# Patient Record
Sex: FEMALE | Race: WHITE | NOT HISPANIC OR LATINO | URBAN - METROPOLITAN AREA
[De-identification: names, ages, dates, MRNs, and addresses within clinical notes are randomized per-mention and may not be internally consistent; named-entity substitution may affect disease eponyms.]

---

## 2017-01-01 ENCOUNTER — INPATIENT (INPATIENT)
Facility: HOSPITAL | Age: 82
LOS: 3 days | DRG: 64 | End: 2017-07-26
Attending: INTERNAL MEDICINE | Admitting: PSYCHIATRY & NEUROLOGY
Payer: MEDICARE

## 2017-01-01 VITALS
SYSTOLIC BLOOD PRESSURE: 145 MMHG | DIASTOLIC BLOOD PRESSURE: 71 MMHG | HEART RATE: 96 BPM | TEMPERATURE: 98 F | OXYGEN SATURATION: 95 % | RESPIRATION RATE: 25 BRPM

## 2017-01-01 VITALS
OXYGEN SATURATION: 100 % | DIASTOLIC BLOOD PRESSURE: 89 MMHG | HEART RATE: 86 BPM | SYSTOLIC BLOOD PRESSURE: 164 MMHG | RESPIRATION RATE: 16 BRPM

## 2017-01-01 DIAGNOSIS — I63.9 CEREBRAL INFARCTION, UNSPECIFIED: ICD-10-CM

## 2017-01-01 DIAGNOSIS — Z98.890 OTHER SPECIFIED POSTPROCEDURAL STATES: Chronic | ICD-10-CM

## 2017-01-01 DIAGNOSIS — J96.00 ACUTE RESPIRATORY FAILURE, UNSPECIFIED WHETHER WITH HYPOXIA OR HYPERCAPNIA: ICD-10-CM

## 2017-01-01 DIAGNOSIS — G93.40 ENCEPHALOPATHY, UNSPECIFIED: ICD-10-CM

## 2017-01-01 DIAGNOSIS — Z51.5 ENCOUNTER FOR PALLIATIVE CARE: ICD-10-CM

## 2017-01-01 DIAGNOSIS — R52 PAIN, UNSPECIFIED: ICD-10-CM

## 2017-01-01 LAB
ALBUMIN SERPL ELPH-MCNC: 3.7 G/DL — SIGNIFICANT CHANGE UP (ref 3.3–5)
ALP SERPL-CCNC: 130 U/L — HIGH (ref 40–120)
ALT FLD-CCNC: 17 U/L RC — SIGNIFICANT CHANGE UP (ref 10–45)
ANION GAP SERPL CALC-SCNC: 10 MMOL/L — SIGNIFICANT CHANGE UP (ref 5–17)
ANION GAP SERPL CALC-SCNC: 12 MMOL/L — SIGNIFICANT CHANGE UP (ref 5–17)
ANION GAP SERPL CALC-SCNC: 14 MMOL/L — SIGNIFICANT CHANGE UP (ref 5–17)
APTT BLD: 42.9 SEC — HIGH (ref 27.5–37.4)
AST SERPL-CCNC: 25 U/L — SIGNIFICANT CHANGE UP (ref 10–40)
BASOPHILS # BLD AUTO: 0.1 K/UL — SIGNIFICANT CHANGE UP (ref 0–0.2)
BASOPHILS NFR BLD AUTO: 0.7 % — SIGNIFICANT CHANGE UP (ref 0–2)
BILIRUB SERPL-MCNC: 0.4 MG/DL — SIGNIFICANT CHANGE UP (ref 0.2–1.2)
BLD GP AB SCN SERPL QL: NEGATIVE — SIGNIFICANT CHANGE UP
BUN SERPL-MCNC: 25 MG/DL — HIGH (ref 7–23)
BUN SERPL-MCNC: 25 MG/DL — HIGH (ref 7–23)
BUN SERPL-MCNC: 31 MG/DL — HIGH (ref 7–23)
CALCIUM SERPL-MCNC: 8.6 MG/DL — SIGNIFICANT CHANGE UP (ref 8.4–10.5)
CALCIUM SERPL-MCNC: 8.7 MG/DL — SIGNIFICANT CHANGE UP (ref 8.4–10.5)
CALCIUM SERPL-MCNC: 9.4 MG/DL — SIGNIFICANT CHANGE UP (ref 8.4–10.5)
CHLORIDE SERPL-SCNC: 106 MMOL/L — SIGNIFICANT CHANGE UP (ref 96–108)
CHLORIDE SERPL-SCNC: 108 MMOL/L — SIGNIFICANT CHANGE UP (ref 96–108)
CHLORIDE SERPL-SCNC: 112 MMOL/L — HIGH (ref 96–108)
CHOLEST SERPL-MCNC: 212 MG/DL — HIGH (ref 10–199)
CK MB BLD-MCNC: 7.3 % — HIGH (ref 0–3.5)
CK MB CFR SERPL CALC: 1.1 NG/ML — SIGNIFICANT CHANGE UP (ref 0–3.8)
CK MB CFR SERPL CALC: 1.4 NG/ML — SIGNIFICANT CHANGE UP (ref 0–3.8)
CK MB CFR SERPL CALC: 1.9 NG/ML — SIGNIFICANT CHANGE UP (ref 0–3.8)
CK SERPL-CCNC: 23 U/L — LOW (ref 25–170)
CK SERPL-CCNC: 26 U/L — SIGNIFICANT CHANGE UP (ref 25–170)
CO2 SERPL-SCNC: 22 MMOL/L — SIGNIFICANT CHANGE UP (ref 22–31)
CO2 SERPL-SCNC: 24 MMOL/L — SIGNIFICANT CHANGE UP (ref 22–31)
CO2 SERPL-SCNC: 26 MMOL/L — SIGNIFICANT CHANGE UP (ref 22–31)
CREAT SERPL-MCNC: 0.46 MG/DL — LOW (ref 0.5–1.3)
CREAT SERPL-MCNC: 0.52 MG/DL — SIGNIFICANT CHANGE UP (ref 0.5–1.3)
CREAT SERPL-MCNC: 0.75 MG/DL — SIGNIFICANT CHANGE UP (ref 0.5–1.3)
DIGOXIN SERPL-MCNC: 0.3 NG/ML — LOW (ref 0.8–2)
EOSINOPHIL # BLD AUTO: 0.1 K/UL — SIGNIFICANT CHANGE UP (ref 0–0.5)
EOSINOPHIL NFR BLD AUTO: 1.4 % — SIGNIFICANT CHANGE UP (ref 0–6)
GAS PNL BLDA: SIGNIFICANT CHANGE UP
GAS PNL BLDV: SIGNIFICANT CHANGE UP
GLUCOSE SERPL-MCNC: 144 MG/DL — HIGH (ref 70–99)
GLUCOSE SERPL-MCNC: 153 MG/DL — HIGH (ref 70–99)
GLUCOSE SERPL-MCNC: 184 MG/DL — HIGH (ref 70–99)
HBA1C BLD-MCNC: 5.7 % — HIGH (ref 4–5.6)
HCT VFR BLD CALC: 33.4 % — LOW (ref 34.5–45)
HCT VFR BLD CALC: 33.7 % — LOW (ref 34.5–45)
HCT VFR BLD CALC: 36.8 % — SIGNIFICANT CHANGE UP (ref 34.5–45)
HDLC SERPL-MCNC: 60 MG/DL — SIGNIFICANT CHANGE UP (ref 40–125)
HGB BLD-MCNC: 11 G/DL — LOW (ref 11.5–15.5)
HGB BLD-MCNC: 11.1 G/DL — LOW (ref 11.5–15.5)
HGB BLD-MCNC: 11.8 G/DL — SIGNIFICANT CHANGE UP (ref 11.5–15.5)
INR BLD: 1.18 RATIO — HIGH (ref 0.88–1.16)
LIPID PNL WITH DIRECT LDL SERPL: 138 MG/DL — HIGH
LYMPHOCYTES # BLD AUTO: 2.2 K/UL — SIGNIFICANT CHANGE UP (ref 1–3.3)
LYMPHOCYTES # BLD AUTO: 25.3 % — SIGNIFICANT CHANGE UP (ref 13–44)
MAGNESIUM SERPL-MCNC: 1.9 MG/DL — SIGNIFICANT CHANGE UP (ref 1.6–2.6)
MCHC RBC-ENTMCNC: 32 GM/DL — SIGNIFICANT CHANGE UP (ref 32–36)
MCHC RBC-ENTMCNC: 32.7 GM/DL — SIGNIFICANT CHANGE UP (ref 32–36)
MCHC RBC-ENTMCNC: 33 PG — SIGNIFICANT CHANGE UP (ref 27–34)
MCHC RBC-ENTMCNC: 33.2 GM/DL — SIGNIFICANT CHANGE UP (ref 32–36)
MCHC RBC-ENTMCNC: 33.5 PG — SIGNIFICANT CHANGE UP (ref 27–34)
MCHC RBC-ENTMCNC: 33.9 PG — SIGNIFICANT CHANGE UP (ref 27–34)
MCV RBC AUTO: 102 FL — HIGH (ref 80–100)
MCV RBC AUTO: 102 FL — HIGH (ref 80–100)
MCV RBC AUTO: 103 FL — HIGH (ref 80–100)
MONOCYTES # BLD AUTO: 0.6 K/UL — SIGNIFICANT CHANGE UP (ref 0–0.9)
MONOCYTES NFR BLD AUTO: 7.1 % — SIGNIFICANT CHANGE UP (ref 2–14)
NEUTROPHILS # BLD AUTO: 5.8 K/UL — SIGNIFICANT CHANGE UP (ref 1.8–7.4)
NEUTROPHILS NFR BLD AUTO: 65.5 % — SIGNIFICANT CHANGE UP (ref 43–77)
PHOSPHATE SERPL-MCNC: 2.5 MG/DL — SIGNIFICANT CHANGE UP (ref 2.5–4.5)
PHOSPHATE SERPL-MCNC: 3.1 MG/DL — SIGNIFICANT CHANGE UP (ref 2.5–4.5)
PHOSPHATE SERPL-MCNC: 3.5 MG/DL — SIGNIFICANT CHANGE UP (ref 2.5–4.5)
PLATELET # BLD AUTO: 236 K/UL — SIGNIFICANT CHANGE UP (ref 150–400)
PLATELET # BLD AUTO: 250 K/UL — SIGNIFICANT CHANGE UP (ref 150–400)
PLATELET # BLD AUTO: 299 K/UL — SIGNIFICANT CHANGE UP (ref 150–400)
POTASSIUM SERPL-MCNC: 3.9 MMOL/L — SIGNIFICANT CHANGE UP (ref 3.5–5.3)
POTASSIUM SERPL-MCNC: 4.3 MMOL/L — SIGNIFICANT CHANGE UP (ref 3.5–5.3)
POTASSIUM SERPL-MCNC: 5 MMOL/L — SIGNIFICANT CHANGE UP (ref 3.5–5.3)
POTASSIUM SERPL-SCNC: 3.9 MMOL/L — SIGNIFICANT CHANGE UP (ref 3.5–5.3)
POTASSIUM SERPL-SCNC: 4.3 MMOL/L — SIGNIFICANT CHANGE UP (ref 3.5–5.3)
POTASSIUM SERPL-SCNC: 5 MMOL/L — SIGNIFICANT CHANGE UP (ref 3.5–5.3)
PROT SERPL-MCNC: 7.2 G/DL — SIGNIFICANT CHANGE UP (ref 6–8.3)
PROTHROM AB SERPL-ACNC: 12.9 SEC — HIGH (ref 9.8–12.7)
RBC # BLD: 3.27 M/UL — LOW (ref 3.8–5.2)
RBC # BLD: 3.29 M/UL — LOW (ref 3.8–5.2)
RBC # BLD: 3.57 M/UL — LOW (ref 3.8–5.2)
RBC # FLD: 13.3 % — SIGNIFICANT CHANGE UP (ref 10.3–14.5)
RBC # FLD: 13.4 % — SIGNIFICANT CHANGE UP (ref 10.3–14.5)
RBC # FLD: 13.4 % — SIGNIFICANT CHANGE UP (ref 10.3–14.5)
RH IG SCN BLD-IMP: POSITIVE — SIGNIFICANT CHANGE UP
RH IG SCN BLD-IMP: POSITIVE — SIGNIFICANT CHANGE UP
SODIUM SERPL-SCNC: 142 MMOL/L — SIGNIFICANT CHANGE UP (ref 135–145)
SODIUM SERPL-SCNC: 144 MMOL/L — SIGNIFICANT CHANGE UP (ref 135–145)
SODIUM SERPL-SCNC: 148 MMOL/L — HIGH (ref 135–145)
T4 AB SER-ACNC: 6.8 UG/DL — SIGNIFICANT CHANGE UP (ref 4.6–12)
TOTAL CHOLESTEROL/HDL RATIO MEASUREMENT: 3.5 RATIO — SIGNIFICANT CHANGE UP (ref 3.3–7.1)
TRIGL SERPL-MCNC: 71 MG/DL — SIGNIFICANT CHANGE UP (ref 10–149)
TROPONIN T SERPL-MCNC: <0.01 NG/ML — SIGNIFICANT CHANGE UP (ref 0–0.06)
TSH SERPL-MCNC: 1.91 UIU/ML — SIGNIFICANT CHANGE UP (ref 0.27–4.2)
WBC # BLD: 11 K/UL — HIGH (ref 3.8–10.5)
WBC # BLD: 8.9 K/UL — SIGNIFICANT CHANGE UP (ref 3.8–10.5)
WBC # BLD: 9.7 K/UL — SIGNIFICANT CHANGE UP (ref 3.8–10.5)
WBC # FLD AUTO: 11 K/UL — HIGH (ref 3.8–10.5)
WBC # FLD AUTO: 8.9 K/UL — SIGNIFICANT CHANGE UP (ref 3.8–10.5)
WBC # FLD AUTO: 9.7 K/UL — SIGNIFICANT CHANGE UP (ref 3.8–10.5)

## 2017-01-01 PROCEDURE — 93010 ELECTROCARDIOGRAM REPORT: CPT

## 2017-01-01 PROCEDURE — 94002 VENT MGMT INPAT INIT DAY: CPT

## 2017-01-01 PROCEDURE — 80048 BASIC METABOLIC PNL TOTAL CA: CPT

## 2017-01-01 PROCEDURE — 70450 CT HEAD/BRAIN W/O DYE: CPT | Mod: 26

## 2017-01-01 PROCEDURE — 70450 CT HEAD/BRAIN W/O DYE: CPT

## 2017-01-01 PROCEDURE — 83036 HEMOGLOBIN GLYCOSYLATED A1C: CPT

## 2017-01-01 PROCEDURE — 99291 CRITICAL CARE FIRST HOUR: CPT

## 2017-01-01 PROCEDURE — 93970 EXTREMITY STUDY: CPT

## 2017-01-01 PROCEDURE — 85610 PROTHROMBIN TIME: CPT

## 2017-01-01 PROCEDURE — 82803 BLOOD GASES ANY COMBINATION: CPT

## 2017-01-01 PROCEDURE — 86850 RBC ANTIBODY SCREEN: CPT

## 2017-01-01 PROCEDURE — 96374 THER/PROPH/DIAG INJ IV PUSH: CPT

## 2017-01-01 PROCEDURE — 99291 CRITICAL CARE FIRST HOUR: CPT | Mod: 25

## 2017-01-01 PROCEDURE — 82550 ASSAY OF CK (CPK): CPT

## 2017-01-01 PROCEDURE — 99223 1ST HOSP IP/OBS HIGH 75: CPT

## 2017-01-01 PROCEDURE — 70496 CT ANGIOGRAPHY HEAD: CPT | Mod: 26

## 2017-01-01 PROCEDURE — 83605 ASSAY OF LACTIC ACID: CPT

## 2017-01-01 PROCEDURE — 84100 ASSAY OF PHOSPHORUS: CPT

## 2017-01-01 PROCEDURE — 70496 CT ANGIOGRAPHY HEAD: CPT

## 2017-01-01 PROCEDURE — 93005 ELECTROCARDIOGRAM TRACING: CPT

## 2017-01-01 PROCEDURE — 84436 ASSAY OF TOTAL THYROXINE: CPT

## 2017-01-01 PROCEDURE — 71010: CPT | Mod: 26

## 2017-01-01 PROCEDURE — 86901 BLOOD TYPING SEROLOGIC RH(D): CPT

## 2017-01-01 PROCEDURE — 80162 ASSAY OF DIGOXIN TOTAL: CPT

## 2017-01-01 PROCEDURE — 82553 CREATINE MB FRACTION: CPT

## 2017-01-01 PROCEDURE — 70498 CT ANGIOGRAPHY NECK: CPT | Mod: 26

## 2017-01-01 PROCEDURE — 84132 ASSAY OF SERUM POTASSIUM: CPT

## 2017-01-01 PROCEDURE — 70498 CT ANGIOGRAPHY NECK: CPT

## 2017-01-01 PROCEDURE — 93306 TTE W/DOPPLER COMPLETE: CPT

## 2017-01-01 PROCEDURE — 85027 COMPLETE CBC AUTOMATED: CPT

## 2017-01-01 PROCEDURE — 83735 ASSAY OF MAGNESIUM: CPT

## 2017-01-01 PROCEDURE — 82947 ASSAY GLUCOSE BLOOD QUANT: CPT

## 2017-01-01 PROCEDURE — 82330 ASSAY OF CALCIUM: CPT

## 2017-01-01 PROCEDURE — 85730 THROMBOPLASTIN TIME PARTIAL: CPT

## 2017-01-01 PROCEDURE — 80053 COMPREHEN METABOLIC PANEL: CPT

## 2017-01-01 PROCEDURE — 86900 BLOOD TYPING SEROLOGIC ABO: CPT

## 2017-01-01 PROCEDURE — 85014 HEMATOCRIT: CPT

## 2017-01-01 PROCEDURE — 94640 AIRWAY INHALATION TREATMENT: CPT

## 2017-01-01 PROCEDURE — 93306 TTE W/DOPPLER COMPLETE: CPT | Mod: 26

## 2017-01-01 PROCEDURE — 70450 CT HEAD/BRAIN W/O DYE: CPT | Mod: 26,59

## 2017-01-01 PROCEDURE — 93970 EXTREMITY STUDY: CPT | Mod: 26

## 2017-01-01 PROCEDURE — 84484 ASSAY OF TROPONIN QUANT: CPT

## 2017-01-01 PROCEDURE — 80061 LIPID PANEL: CPT

## 2017-01-01 PROCEDURE — 71045 X-RAY EXAM CHEST 1 VIEW: CPT

## 2017-01-01 PROCEDURE — 82435 ASSAY OF BLOOD CHLORIDE: CPT

## 2017-01-01 PROCEDURE — 99231 SBSQ HOSP IP/OBS SF/LOW 25: CPT

## 2017-01-01 PROCEDURE — 84443 ASSAY THYROID STIM HORMONE: CPT

## 2017-01-01 PROCEDURE — 84295 ASSAY OF SERUM SODIUM: CPT

## 2017-01-01 RX ORDER — DIGOXIN 250 MCG
0.25 TABLET ORAL ONCE
Qty: 0 | Refills: 0 | Status: COMPLETED | OUTPATIENT
Start: 2017-01-01 | End: 2017-01-01

## 2017-01-01 RX ORDER — IPRATROPIUM/ALBUTEROL SULFATE 18-103MCG
3 AEROSOL WITH ADAPTER (GRAM) INHALATION EVERY 6 HOURS
Qty: 0 | Refills: 0 | Status: COMPLETED | OUTPATIENT
Start: 2017-01-01 | End: 2017-01-01

## 2017-01-01 RX ORDER — DEXMEDETOMIDINE HYDROCHLORIDE IN 0.9% SODIUM CHLORIDE 4 UG/ML
0.2 INJECTION INTRAVENOUS
Qty: 200 | Refills: 0 | Status: DISCONTINUED | OUTPATIENT
Start: 2017-01-01 | End: 2017-01-01

## 2017-01-01 RX ORDER — ENOXAPARIN SODIUM 100 MG/ML
40 INJECTION SUBCUTANEOUS DAILY
Qty: 0 | Refills: 0 | Status: DISCONTINUED | OUTPATIENT
Start: 2017-01-01 | End: 2017-01-01

## 2017-01-01 RX ORDER — CHLORHEXIDINE GLUCONATE 213 G/1000ML
15 SOLUTION TOPICAL
Qty: 0 | Refills: 0 | Status: DISCONTINUED | OUTPATIENT
Start: 2017-01-01 | End: 2017-01-01

## 2017-01-01 RX ORDER — ACETAMINOPHEN 500 MG
650 TABLET ORAL ONCE
Qty: 0 | Refills: 0 | Status: COMPLETED | OUTPATIENT
Start: 2017-01-01 | End: 2017-01-01

## 2017-01-01 RX ORDER — MORPHINE SULFATE 50 MG/1
0.5 CAPSULE, EXTENDED RELEASE ORAL
Qty: 100 | Refills: 0 | Status: DISCONTINUED | OUTPATIENT
Start: 2017-01-01 | End: 2017-01-01

## 2017-01-01 RX ORDER — ROBINUL 0.2 MG/ML
0.1 INJECTION INTRAMUSCULAR; INTRAVENOUS EVERY 12 HOURS
Qty: 0 | Refills: 0 | Status: DISCONTINUED | OUTPATIENT
Start: 2017-01-01 | End: 2017-01-01

## 2017-01-01 RX ORDER — MORPHINE SULFATE 50 MG/1
1 CAPSULE, EXTENDED RELEASE ORAL
Qty: 0 | Refills: 0 | Status: DISCONTINUED | OUTPATIENT
Start: 2017-01-01 | End: 2017-01-01

## 2017-01-01 RX ORDER — ASPIRIN/CALCIUM CARB/MAGNESIUM 324 MG
300 TABLET ORAL DAILY
Qty: 0 | Refills: 0 | Status: DISCONTINUED | OUTPATIENT
Start: 2017-01-01 | End: 2017-01-01

## 2017-01-01 RX ORDER — PROPOFOL 10 MG/ML
10 INJECTION, EMULSION INTRAVENOUS
Qty: 1000 | Refills: 0 | Status: DISCONTINUED | OUTPATIENT
Start: 2017-01-01 | End: 2017-01-01

## 2017-01-01 RX ORDER — ACETYLCYSTEINE 200 MG/ML
3 VIAL (ML) MISCELLANEOUS EVERY 6 HOURS
Qty: 0 | Refills: 0 | Status: COMPLETED | OUTPATIENT
Start: 2017-01-01 | End: 2017-01-01

## 2017-01-01 RX ORDER — MORPHINE SULFATE 50 MG/1
1 CAPSULE, EXTENDED RELEASE ORAL
Qty: 100 | Refills: 0 | Status: DISCONTINUED | OUTPATIENT
Start: 2017-01-01 | End: 2017-01-01

## 2017-01-01 RX ORDER — MORPHINE SULFATE 50 MG/1
2 CAPSULE, EXTENDED RELEASE ORAL
Qty: 0 | Refills: 0 | Status: DISCONTINUED | OUTPATIENT
Start: 2017-01-01 | End: 2017-01-01

## 2017-01-01 RX ORDER — ROBINUL 0.2 MG/ML
0.4 INJECTION INTRAMUSCULAR; INTRAVENOUS EVERY 6 HOURS
Qty: 0 | Refills: 0 | Status: DISCONTINUED | OUTPATIENT
Start: 2017-01-01 | End: 2017-01-01

## 2017-01-01 RX ORDER — ROBINUL 0.2 MG/ML
0.2 INJECTION INTRAMUSCULAR; INTRAVENOUS ONCE
Qty: 0 | Refills: 0 | Status: COMPLETED | OUTPATIENT
Start: 2017-01-01 | End: 2017-01-01

## 2017-01-01 RX ORDER — PANTOPRAZOLE SODIUM 20 MG/1
40 TABLET, DELAYED RELEASE ORAL DAILY
Qty: 0 | Refills: 0 | Status: DISCONTINUED | OUTPATIENT
Start: 2017-01-01 | End: 2017-01-01

## 2017-01-01 RX ORDER — DIGOXIN 250 MCG
0.25 TABLET ORAL ONCE
Qty: 0 | Refills: 0 | Status: DISCONTINUED | OUTPATIENT
Start: 2017-01-01 | End: 2017-01-01

## 2017-01-01 RX ORDER — ASPIRIN/CALCIUM CARB/MAGNESIUM 324 MG
81 TABLET ORAL DAILY
Qty: 0 | Refills: 0 | Status: DISCONTINUED | OUTPATIENT
Start: 2017-01-01 | End: 2017-01-01

## 2017-01-01 RX ORDER — SENNA PLUS 8.6 MG/1
2 TABLET ORAL AT BEDTIME
Qty: 0 | Refills: 0 | Status: DISCONTINUED | OUTPATIENT
Start: 2017-01-01 | End: 2017-01-01

## 2017-01-01 RX ORDER — DIGOXIN 250 MCG
0.12 TABLET ORAL DAILY
Qty: 0 | Refills: 0 | Status: DISCONTINUED | OUTPATIENT
Start: 2017-01-01 | End: 2017-01-01

## 2017-01-01 RX ORDER — ATORVASTATIN CALCIUM 80 MG/1
40 TABLET, FILM COATED ORAL AT BEDTIME
Qty: 0 | Refills: 0 | Status: DISCONTINUED | OUTPATIENT
Start: 2017-01-01 | End: 2017-01-01

## 2017-01-01 RX ORDER — SODIUM CHLORIDE 5 G/100ML
1000 INJECTION, SOLUTION INTRAVENOUS
Qty: 0 | Refills: 0 | Status: DISCONTINUED | OUTPATIENT
Start: 2017-01-01 | End: 2017-01-01

## 2017-01-01 RX ORDER — ROBINUL 0.2 MG/ML
1 INJECTION INTRAMUSCULAR; INTRAVENOUS EVERY 12 HOURS
Qty: 0 | Refills: 0 | Status: DISCONTINUED | OUTPATIENT
Start: 2017-01-01 | End: 2017-01-01

## 2017-01-01 RX ADMIN — MORPHINE SULFATE 1 MILLIGRAM(S): 50 CAPSULE, EXTENDED RELEASE ORAL at 01:35

## 2017-01-01 RX ADMIN — Medication 3 MILLILITER(S): at 18:58

## 2017-01-01 RX ADMIN — ENOXAPARIN SODIUM 40 MILLIGRAM(S): 100 INJECTION SUBCUTANEOUS at 13:24

## 2017-01-01 RX ADMIN — Medication 0.25 MILLIGRAM(S): at 18:33

## 2017-01-01 RX ADMIN — SODIUM CHLORIDE 50 MILLILITER(S): 5 INJECTION, SOLUTION INTRAVENOUS at 05:53

## 2017-01-01 RX ADMIN — MORPHINE SULFATE 1 MILLIGRAM(S): 50 CAPSULE, EXTENDED RELEASE ORAL at 06:53

## 2017-01-01 RX ADMIN — CHLORHEXIDINE GLUCONATE 15 MILLILITER(S): 213 SOLUTION TOPICAL at 18:01

## 2017-01-01 RX ADMIN — MORPHINE SULFATE 1 MILLIGRAM(S): 50 CAPSULE, EXTENDED RELEASE ORAL at 18:59

## 2017-01-01 RX ADMIN — Medication 3 MILLILITER(S): at 05:24

## 2017-01-01 RX ADMIN — MORPHINE SULFATE 2 MILLIGRAM(S): 50 CAPSULE, EXTENDED RELEASE ORAL at 05:23

## 2017-01-01 RX ADMIN — MORPHINE SULFATE 1 MG/HR: 50 CAPSULE, EXTENDED RELEASE ORAL at 01:46

## 2017-01-01 RX ADMIN — MORPHINE SULFATE 1 MILLIGRAM(S): 50 CAPSULE, EXTENDED RELEASE ORAL at 13:59

## 2017-01-01 RX ADMIN — MORPHINE SULFATE 1 MILLIGRAM(S): 50 CAPSULE, EXTENDED RELEASE ORAL at 23:45

## 2017-01-01 RX ADMIN — Medication 3 MILLILITER(S): at 18:57

## 2017-01-01 RX ADMIN — Medication 650 MILLIGRAM(S): at 20:41

## 2017-01-01 RX ADMIN — MORPHINE SULFATE 0.5 MG/HR: 50 CAPSULE, EXTENDED RELEASE ORAL at 22:48

## 2017-01-01 RX ADMIN — Medication 3 MILLILITER(S): at 12:01

## 2017-01-01 RX ADMIN — MORPHINE SULFATE 1 MILLIGRAM(S): 50 CAPSULE, EXTENDED RELEASE ORAL at 10:58

## 2017-01-01 RX ADMIN — ROBINUL 0.1 MILLIGRAM(S): 0.2 INJECTION INTRAMUSCULAR; INTRAVENOUS at 18:43

## 2017-01-01 RX ADMIN — Medication 3 MILLILITER(S): at 00:00

## 2017-01-01 RX ADMIN — MORPHINE SULFATE 1 MILLIGRAM(S): 50 CAPSULE, EXTENDED RELEASE ORAL at 16:23

## 2017-01-01 RX ADMIN — MORPHINE SULFATE 2 MILLIGRAM(S): 50 CAPSULE, EXTENDED RELEASE ORAL at 05:08

## 2017-01-01 RX ADMIN — MORPHINE SULFATE 1 MILLIGRAM(S): 50 CAPSULE, EXTENDED RELEASE ORAL at 19:00

## 2017-01-01 RX ADMIN — Medication 81 MILLIGRAM(S): at 12:03

## 2017-01-01 RX ADMIN — MORPHINE SULFATE 1 MILLIGRAM(S): 50 CAPSULE, EXTENDED RELEASE ORAL at 02:54

## 2017-01-01 RX ADMIN — MORPHINE SULFATE 1 MILLIGRAM(S): 50 CAPSULE, EXTENDED RELEASE ORAL at 20:08

## 2017-01-01 RX ADMIN — ATORVASTATIN CALCIUM 40 MILLIGRAM(S): 80 TABLET, FILM COATED ORAL at 22:37

## 2017-01-01 RX ADMIN — MORPHINE SULFATE 1 MILLIGRAM(S): 50 CAPSULE, EXTENDED RELEASE ORAL at 01:32

## 2017-01-01 RX ADMIN — MORPHINE SULFATE 1 MILLIGRAM(S): 50 CAPSULE, EXTENDED RELEASE ORAL at 19:53

## 2017-01-01 RX ADMIN — DEXMEDETOMIDINE HYDROCHLORIDE IN 0.9% SODIUM CHLORIDE 3.21 MICROGRAM(S)/KG/HR: 4 INJECTION INTRAVENOUS at 06:39

## 2017-01-01 RX ADMIN — ROBINUL 0.4 MILLIGRAM(S): 0.2 INJECTION INTRAMUSCULAR; INTRAVENOUS at 23:59

## 2017-01-01 RX ADMIN — Medication 650 MILLIGRAM(S): at 20:26

## 2017-01-01 RX ADMIN — MORPHINE SULFATE 1 MILLIGRAM(S): 50 CAPSULE, EXTENDED RELEASE ORAL at 18:44

## 2017-01-01 RX ADMIN — Medication 0.5 MILLIGRAM(S): at 22:55

## 2017-01-01 RX ADMIN — PROPOFOL 3.81 MICROGRAM(S)/KG/MIN: 10 INJECTION, EMULSION INTRAVENOUS at 18:35

## 2017-01-01 RX ADMIN — SODIUM CHLORIDE 50 MILLILITER(S): 5 INJECTION, SOLUTION INTRAVENOUS at 10:00

## 2017-01-01 RX ADMIN — Medication 0.5 MILLIGRAM(S): at 05:44

## 2017-01-01 RX ADMIN — MORPHINE SULFATE 1 MILLIGRAM(S): 50 CAPSULE, EXTENDED RELEASE ORAL at 23:07

## 2017-01-01 RX ADMIN — ROBINUL 0.4 MILLIGRAM(S): 0.2 INJECTION INTRAMUSCULAR; INTRAVENOUS at 05:08

## 2017-01-01 RX ADMIN — Medication 3 MILLILITER(S): at 12:02

## 2017-01-01 RX ADMIN — MORPHINE SULFATE 1 MILLIGRAM(S): 50 CAPSULE, EXTENDED RELEASE ORAL at 14:14

## 2017-01-01 RX ADMIN — ENOXAPARIN SODIUM 40 MILLIGRAM(S): 100 INJECTION SUBCUTANEOUS at 18:33

## 2017-01-01 RX ADMIN — MORPHINE SULFATE 1 MILLIGRAM(S): 50 CAPSULE, EXTENDED RELEASE ORAL at 02:39

## 2017-01-01 RX ADMIN — CHLORHEXIDINE GLUCONATE 15 MILLILITER(S): 213 SOLUTION TOPICAL at 05:52

## 2017-01-01 RX ADMIN — PANTOPRAZOLE SODIUM 40 MILLIGRAM(S): 20 TABLET, DELAYED RELEASE ORAL at 12:46

## 2017-01-01 RX ADMIN — MORPHINE SULFATE 1 MILLIGRAM(S): 50 CAPSULE, EXTENDED RELEASE ORAL at 18:45

## 2017-01-01 RX ADMIN — Medication 3 MILLILITER(S): at 05:25

## 2017-01-01 RX ADMIN — SENNA PLUS 2 TABLET(S): 8.6 TABLET ORAL at 22:37

## 2017-01-01 RX ADMIN — MORPHINE SULFATE 1 MILLIGRAM(S): 50 CAPSULE, EXTENDED RELEASE ORAL at 16:08

## 2017-01-01 RX ADMIN — PANTOPRAZOLE SODIUM 40 MILLIGRAM(S): 20 TABLET, DELAYED RELEASE ORAL at 12:04

## 2017-01-01 RX ADMIN — MORPHINE SULFATE 1 MILLIGRAM(S): 50 CAPSULE, EXTENDED RELEASE ORAL at 23:22

## 2017-01-01 RX ADMIN — ROBINUL 0.2 MILLIGRAM(S): 0.2 INJECTION INTRAMUSCULAR; INTRAVENOUS at 18:33

## 2017-01-01 RX ADMIN — MORPHINE SULFATE 1 MILLIGRAM(S): 50 CAPSULE, EXTENDED RELEASE ORAL at 07:08

## 2017-01-01 RX ADMIN — MORPHINE SULFATE 1 MILLIGRAM(S): 50 CAPSULE, EXTENDED RELEASE ORAL at 01:20

## 2017-01-01 RX ADMIN — CHLORHEXIDINE GLUCONATE 15 MILLILITER(S): 213 SOLUTION TOPICAL at 18:34

## 2017-01-01 RX ADMIN — Medication 1 TABLET(S): at 14:39

## 2017-01-01 RX ADMIN — Medication 0.12 MILLIGRAM(S): at 12:03

## 2017-01-01 RX ADMIN — MORPHINE SULFATE 1 MILLIGRAM(S): 50 CAPSULE, EXTENDED RELEASE ORAL at 22:26

## 2017-01-01 RX ADMIN — MORPHINE SULFATE 1 MILLIGRAM(S): 50 CAPSULE, EXTENDED RELEASE ORAL at 10:43

## 2017-07-22 NOTE — ED PROVIDER NOTE - OBJECTIVE STATEMENT
85 year old, transferred from [facility] for stroke with facial droop and aphasia. patient began to be more confused and less oriented and was intubated prior to transport for airway protection. history of atrial fibrillation on pradaxa and recent femur fracture a few months ago.

## 2017-07-22 NOTE — ED PROVIDER NOTE - ATTENDING CONTRIBUTION TO CARE
I have seen and evaluated this patient with the resident.   I agree with the findings  unless other wise stated.  I have made appropriate changes in documentations where needed, After my face to face bedside evaluation, I am further  noting: Pt transferred for lethargy and acute cva with ET intubation for airway protection on Pradaxa for evaluation for possible endovscular intervention sedated code stroke evaluation  ET tube and respirator care admit to neuro ICU-- [place]

## 2017-07-22 NOTE — H&P ADULT - NSHPPHYSICALEXAM_GEN_ALL_CORE
General Exam:   General appearance: No acute distress         Neurological Exam:  Mental Status: Aphasic and Lethargic, responds to pain  Cranial Nerves:   PERRL, no nystagmus. No obvious facial asymmetry.    Motor:   Tone: normal.                  Strength: All extremities fall to gravity equally, moves the right more than the left  Tremor: No resting, postural or action tremor.  No myoclonus.    Deep Tendon Reflexes:              Biceps       BR        Patellar        Ankle         Babinski                                         R       2+             1+           1+            1+              downgoing                                         L        2+             1+           2+            1+              downgoing    Gait: normal.

## 2017-07-22 NOTE — ED ADULT NURSE NOTE - OBJECTIVE STATEMENT
85 year old female brought in by EMS from [facility] .  Patient from NH was last seen normal at 2:45.  Staff discovered her to be weak on the left side and AMS.  Patient taken to [place] where they did CTH and clot seen.  Patient transferred to [facility] for further eval.  Patient arrived intubated from [place] where EMS reports patient had gurgling and snorous respirations.  Patient has equal and symmetrical chest rise and EtCo2=37.  Patient moving upper extremities b/l with left sided weakness.  Patient unresponsive and does not follow commands and on propofol gtt for sedation.  Patient has pulses x 4.  Patient arrived with #20 in the right AC and #18 established in the left AC in [facility] ED.  Patient has right sided gaze preference. 85 year old female brought in by EMS from [facility] .  Patient from NH was last seen normal at 2:45.  Staff discovered her to be weak on the left side and AMS.  Patient taken to [place] where they did CTH and clot seen.  Patient transferred to [facility] for further eval.  Patient arrived intubated from [place] where EMS reports patient had gurgling and snorous respirations.  Patient has equal and symmetrical chest rise and EtCo2=37.  Patient moving upper extremities b/l with left sided weakness.  Patient unresponsive and does not follow commands and on propofol gtt for sedation and is calm.  Patient has pulses x 4.  Patient arrived with #20 in the right AC and #18 established in the left AC in [facility] ED.  Patient has right sided gaze preference. Pupil on the right sluggish, but reactive to light and left is fixed.  Patient placed on CM and in AF and transported on CM to CT with neuro and RN present.  Safety ensured. 85 year old female brought in by EMS from [facility] .  Patient from NH was last seen normal at 14:45.  Staff discovered her to be weak on the left side and AMS.  Patient taken to [place] where they did CTH and clot seen.  Patient transferred to [facility] for further eval.  Patient arrived intubated from [place] where EMS reports patient had gurgling and snorous respirations.  Patient has equal and symmetrical chest rise and EtCo2=37.  Patient moving upper extremities b/l with left sided weakness.  Patient unresponsive and does not follow commands and on propofol gtt for sedation and is calm.  Patient has pulses x 4.  Patient arrived with #20 in the right AC and #18 established in the left AC in [facility] ED.  Patient has right sided gaze preference. Pupils 2mm equal and reactive to light.  Patient placed on CM and in AF and transported on CM to CT with neuro and RN present.  Safety ensured.

## 2017-07-22 NOTE — H&P ADULT - HISTORY OF PRESENT ILLNESS
Pt is an 86 yo F with a PMH of A_Fib (on Pradaxa), GERD and Hip Fracture who was sent to our ED from [facility] for possible endovascular intervention for acute stroke. Pt became aphasic and lethargic at around 2:45PM and by the time she had arrived to [facility] she was out of the window for tPA. However on arrival to our ED on CT Head a large right MCA territory infarct had appeared making her an unsuitable candidate for endovascular intervention. NIHSS: 27. SABINE: 3. Pt is an 86 yo F with a PMH of A_Fib (on Pradaxa), GERD and Hip Fracture who was sent to our ED from [facility] for possible endovascular intervention for acute stroke. Pt became aphasic and lethargic at around 2:45PM However on arrival to our ED on CT Head a large right MCA territory infarct had appeared making her an unsuitable candidate for endovascular intervention. NIHSS: 27. SABINE: 3.

## 2017-07-22 NOTE — ED PROVIDER NOTE - CRITICAL CARE PROVIDED
additional history taking/documentation/direct patient care (not related to procedure)/telephone consultation with the patient's family/consultation with other physicians/consult w/ pt's family directly relating to pts condition/conducted a detailed discussion of DNR status/interpretation of diagnostic studies

## 2017-07-22 NOTE — ED ADULT NURSE REASSESSMENT NOTE - NS ED NURSE REASSESS COMMENT FT1
Patient turned and repositioned and linens changed.  Patient suctioned and family at bedside.  Safety ensured.

## 2017-07-22 NOTE — ED PROVIDER NOTE - MEDICAL DECISION MAKING DETAILS
Pt transferred for lethargy and acute cva with ET intubation for airway protection on Pradaxa for evaluation for possible endovscular intervention sedated code stroke evaluation admit to neuro ICU-- Boswell

## 2017-07-22 NOTE — ED PROVIDER NOTE - PHYSICAL EXAMINATION
Michele: intubated, sedated, HEENT with rightward gaze,  lungs CTAB on vent, heart with irreg rhythm without murmur; abdomen soft NTND; extremities with no edema; skin with no rashes, neuro exam responsive to pain in all four extremities, on propofol not following commands

## 2017-07-22 NOTE — H&P ADULT - ASSESSMENT
Pt is an 86 yo F with a PMH of A_Fib (on Pradaxa), GERD and Hip Fracture who was sent to our ED from [facility] for possible endovascular intervention for acute stroke. NIHSS: 27. SABINE: 3.    Diagnosis:  - Acute right MCA territory infarct 2/2 probable cardioembolic source    Recommendations:  - Admit to Neuro ICU  - Continuos telemetry monitoring  - Maintain safe mechanical ventilation with continuous pulse ox monitoring  - MRI Head  - MRA Head and Neck  - TTE with Bubble study  - PT/OT  - Speech and Swallow Eval  - Permissive HTN to 220/110  - ASA and Lipitor (when able) Pt is an 84 yo F with a PMH of A_Fib (on Pradaxa), GERD and Hip Fracture who was sent to our ED from [facility] for possible endovascular intervention for acute stroke. NIHSS: 27. SABINE: 3.    Diagnosis:  - Acute right MCA territory infarct 2/2 probable cardioembolic source    Recommendations:  - Admit to Neuro ICU  - Continuos telemetry monitoring  - Maintain safe mechanical ventilation with continuous pulse ox monitoring  - MRI Head  - MRA Head and Neck  - TTE with Bubble study  - PT/OT  - Speech and Swallow Eval  - Permissive HTN to 220/110  - ASA and Lipitor (when able)  - DVT ppx

## 2017-07-22 NOTE — H&P ADULT - ATTENDING COMMENTS
Ms. Hernandez is a 86 yo F with a PMH of A_Fib (on Pradaxa), GERD and Hip Fracture who was sent to our ED from [facility] for possible endovascular intervention for acute stroke. Pt became aphasic and lethargic at around 2:45PM which was known to be her last seen normal, I last does of Dabigatran was found to be the morning and 7/22/2017. I suggested her flushing the emergency department to offer intravenous thrombolysis if her PTT were within normal limits, however this recommendation was not followed. Patient was transferred to [facility] for possible neuro- intervention despite my advice that she could not be a candidate for neuroendovascular treatment due to early ischemic changes on Head CT.  Her head CT at [place] to be large right MCA infarction because of which neuroendovascular therapy was not performed despite large vessel occlusion of right MCA.  Impression: Cerebral infarction likely secondary to cerebral embolism atrial fibrillation  Recommendations:  Limits of hypertension aspirin 300 mg a  DVT prophylaxis  I spoke to the family in detail regarding his being a massive right MCA infarction and associated morbidity.

## 2017-07-22 NOTE — H&P ADULT - NSHPLABSRESULTS_GEN_ALL_CORE
< from: CT Brain Stroke Protocol (07.22.17 @ 19:38) >    Acute/subacute large right MCA territory infarct involving more than one   third distribution. No hemorrhagic transformation.    < from: CT Angio Neck w/ IV Cont (07.22.17 @ 20:16) >    CTA HEAD:  1. Abrupt occlusion of the right proximal M1 segment with distal   reconstitution of the right middle cerebral artery through collateral   flow.  2. Intracranial arterial atherosclerotic disease with multifocal short   segment areas of stenosis.    CTA NECK:  1. Atherosclerotic disease of the bilateral carotid bifurcations with   approximately 10-20% stenosis of the left internal carotid artery by   NASCET criteria.  2. Otherwise unremarkableexamination.

## 2017-07-23 NOTE — PROGRESS NOTE ADULT - ASSESSMENT
Summary: 86yo woman R MCA stroke, no tPA    NEURO:  q1h neuro checks, pain control, no sedation at this time, CTH this am, hemicrani watch, Na 145-150 on 2%, ASA to start once no longer hemicrani watch, start statin, f/u A1C/lipid panel    CARDS:  -150, afib on pradaxa, hold a/c this time, rate controlled currently, resume home meds    PULM:  sat > 92%, PRVC wean as tolerated to extubate    RENAL:  2%, Q8BMP, giang to be d/c'ed    GASTRO: NPO  ---> Stress ulcer prophylaxis:  PPI    HEME:  monitor H/H , hx hip fx, recent rehab stay, DVT screen at admission  ---> DVT prophylaxis: SCDs, hold anticoagulation clive crani watch    ENDO:  euglycemia    ID:  afebrile    Code status:  Full code  Disposition:  ICU

## 2017-07-23 NOTE — PROGRESS NOTE ADULT - ATTENDING COMMENTS
85 year-old woman with embolic right MCA stroke requiring intubation. Family requested DNR status. Present family wish to confer with other family members regarding further care, but are leaning towards comfort care.    Examination: No eye opening or command following, pupils 2mm reactive b/l, +cough/gag, RUE localizes, LUE no movement, RLE withdraws, LLE triple flexes, lungs clear, heart irregular, abdomen soft, right leg swelling    Right LE U/S: prelim without DVT    Embolic right holo MCA stroke  - continue goals of care discussion with family  - Palliative care c/s  - stroke core measures  - vent support until goals of care clarified  - raise RLE to aid venous drainage    45 minutes critical care time

## 2017-07-24 NOTE — PROGRESS NOTE ADULT - PROBLEM SELECTOR PLAN 2
s/p extubation, DO NOT  reintubate, medicate to comfort if pt shows signs of respiratory distress that would otherwise indicate intubation s/p extubation, DO NOT  reintubate, medicate to comfort if pt shows signs of respiratory distress that would otherwise indicate intubation.  Recommendations:  Morphine 1 mg q1 hours prn dyspnea.  Low tolerance for drip

## 2017-07-24 NOTE — PROGRESS NOTE ADULT - PROBLEM SELECTOR PLAN 4
Lengthy discussion with daughter Judy and daughter Hawa.  Judy has taken care of Mom and lives with her.  Pts   8 weeks ago, followed by pt falling sustaining hip fracture, in rehab pt noted to have change in mental status in setting of MCA stroke.  Daughters do not wish to pursue re intubation. If pt does well post extubation , rehab is the dispo, if patient does poorly , full comfort approach is the goals of care.  Above discussed with team

## 2017-07-24 NOTE — PROGRESS NOTE ADULT - ASSESSMENT
ASSESSMENT: This is a 85ywoman with a PMH of AFib (on Pradaxa), GERD and Hip Fracture who was sent to our ED from [facility] for possible endovascular intervention for acute stroke. Pt became aphasic and lethargic at around 2:45PM However on arrival to our ED on CT Head a large right MCA territory infarct had appeared making her an unsuitable candidate for endovascular intervention. Remains orally intubated, weaning attempt in progress, tolerating well. Patient is more responsive, able to follow few simple commands, eye apraxia continues. Exam limited by oral intubation. Continue on 2 % Ns, Serum Na 148.  Patient's status is improving, plan is to wean and extubate, no trach or PEG. Continue current treatment regimen.      NEURO: Continue close monitoring for neurologic deterioration, permissive HTN. Continue Hypertonic saline: 2% for cerebral edema. Ttitrate statin to LDL goal less than 70, MRI Brain w/o, MRA Head w/o and Neck w/contrast.      ANTITHROMBOTIC THERAPY:   Continue ASA for secondary stroke prevention.    PULMONARY: Orally intubated on CPAP, protecting airway, saturating well. CXR Left pleural effusion, right lung clear.     CARDIOVASCULAR: check TTE, cardiac monitoring. Patient has afib, rate controlled on Digoxin.                          SBP goal: Permissive HTN    GASTROINTESTINAL: Ulcer prophylaxis, dysphagia screen when patient successfully extubated.        Diet: NPO    RENAL: BUN/Cr stable, good urine output      Na Goal: Greater than 135     Yanes:    HEMATOLOGY: H/H stable, Platelets normal      DVT ppx: Heparin s.c [] LMWH [X] , PAS     ID: afebrile,  mild uptick WBC, may be reactionary, continue to monitor     DISPOSITION: Rehab or home depending on PT eval once stable and workup is complet    CORE MEASURES:        Admission NIHSS: Y      TPA: [] YES [x] NO      LDL/HDL: Y     Depression Screen: pending     Statin Therapy: Y (Lipitor 40mg)     Dysphagia Screen: [] PASS [] FAIL: Pending successful extubation     Smoking [] YES [x] NO      Afib [X] YES [] NO rate controlled on digoxin.     Stroke Education [] YES [x] NO ASSESSMENT: This is a 85ywoman with a PMH of AFib (on Pradaxa), GERD and Hip Fracture who was sent to our ED from [facility] for possible endovascular intervention for acute stroke. Pt became aphasic and lethargic at around 2:45PM However on arrival to our ED on CT Head a large right MCA territory infarct had appeared making her an unsuitable candidate for endovascular intervention. Etiology most likely embolic in origin. Remains orally intubated, weaning attempt in progress, tolerating well. Patient is more responsive, able to follow few simple commands, eye apraxia continues. Exam limited by oral intubation. Continue on 2 % Ns, Serum Na 148.  Patient's status is improving, plan is to wean and extubate, no trach or PEG. Continue current treatment regimen.      NEURO: Continue close monitoring for neurologic deterioration, permissive HTN. Continue Hypertonic saline: 2% for cerebral edema. Ttitrate statin to LDL goal less than 70, MRI Brain w/o, MRA Head w/o and Neck w/contrast.      ANTITHROMBOTIC THERAPY:   Continue ASA for secondary stroke prevention.  Resume Pradaxa for anticoagulation , non valvular afib.    PULMONARY: Orally intubated on CPAP, protecting airway, saturating well. CXR Left pleural effusion, right lung clear.     CARDIOVASCULAR: check TTE, cardiac monitoring. Patient has afib, rate controlled on Digoxin.                          SBP goal: Permissive HTN    GASTROINTESTINAL: Ulcer prophylaxis, dysphagia screen when patient successfully extubated.        Diet: NPO    RENAL: BUN/Cr stable, good urine output      Na Goal: Greater than 135     Yanes:    HEMATOLOGY: H/H stable, Platelets normal      DVT ppx: Heparin s.c [] LMWH [X] , PAS     ID: afebrile,  mild uptick WBC, may be reactionary, continue to monitor     DISPOSITION: Rehab or home depending on PT eval once stable and workup is complet    CORE MEASURES:        Admission NIHSS: Y      TPA: [] YES [x] NO      LDL/HDL: Y     Depression Screen: pending     Statin Therapy: Y (Lipitor 40mg)     Dysphagia Screen: [] PASS [] FAIL: Pending successful extubation     Smoking [] YES [x] NO      Afib [X] YES [] NO rate controlled on digoxin.     Stroke Education [] YES [x] NO

## 2017-07-24 NOTE — DIETITIAN INITIAL EVALUATION ADULT. - OTHER INFO
Pt seen for: length of stay.  Adm dx:  CVA, intubated    GI issues: no N/V/D   Last BM: none since adm.   Food allergies: NKFA   Vit/supplement PTA: Prostat, Ca+D, vitamin B12

## 2017-07-24 NOTE — DIETITIAN INITIAL EVALUATION ADULT. - NS AS NUTRI INTERV ENTERAL NUTRITION
Recommend Jevity 1.2 at 75 cc/hr x 18 hrs provides 1620 kcals, 75 gm protein, 1089cc free water  for 25 kcals/kg, 1.1 gm protein/kg dosing wt of 64.2 kg

## 2017-07-24 NOTE — DIETITIAN INITIAL EVALUATION ADULT. - ENERGY NEEDS
Ht: 62"  Wt:141.5   BMI: 25.9 kg/m2   IBW: 110 (+/-10%)     128% IBW  Edema: 1+ generalized     Skin: no pressure injuries

## 2017-07-24 NOTE — AIRWAY REMOVAL NOTE  ADULT & PEDS - ARTIFICAL AIRWAY REMOVAL COMMENTS
elective discontinuation of ventilator support. Patient extubated without complications. Written order for extubation was verified. The patient was identified by full name and birthdate compared to the ID band. Present during the procedure was ABRAHAM Amador

## 2017-07-25 NOTE — PROGRESS NOTE ADULT - SUBJECTIVE AND OBJECTIVE BOX
Elderly woman with 2 days of right middle cerebral artery infarction, improved consciousness and left sided strength, still in respiratory insufficiency. discuss with palliative and family clarified that mechanical ventilation should be avoided even for short transitional phase, so no tracheostomy and percutaneous endoscopic gastrostomy will be performed, continue mechanical ventilation and titrate to extubate as she is indeed improving, with eye opening apraxia but obeying briskly in Nepali, 3/5 on left, intubated. Start aspirin for stroke sec prev, enoxaparin for venous thromboembolism prophylaxis continue hypertonic saline 2% for cerebral edema, continue digoxin for atrial fibrillation atorvastatin proton pomp inhibitor for gastroduodenal ulcer and gastritis prophylaxis senna & docusate for constipation prophylaxis glucerna in artificial tube feeding and plan for elective palliative extubation when she has good chances of passing the test, not in an end-of-life setting.  critical care time 45 minutes dedicated to this patient at risk of neurological deterioration or death due to respiratory failure aspiration
HE PATIENT WAS SEEN AND EXAMINED BY MARILY Schaeefr NP, findings reviewed with Dr. Funk.     HPI:  Pt is an 84 yo F with a PMH of AFib (on Pradaxa), GERD and Hip Fracture who was sent to our ED from [facility] for possible endovascular intervention for acute stroke. Pt became aphasic and lethargic at around 2:45PM However on arrival to our ED on CT Head a large right MCA territory infarct had appeared making her an unsuitable candidate for endovascular intervention. NIHSS: 27. SABINE: 3        SUBJECTIVE: No events overnight.  No new neurologic complaints.      senna 2 Tablet(s) Oral at bedtime  enoxaparin Injectable 40 milliGRAM(s) SubCutaneous daily  morphine  - Injectable 1 milliGRAM(s) IV Push every 1 hour PRN  glycopyrrolate Injectable 0.1 milliGRAM(s) IV Push every 12 hours PRN  morphine  - Injectable 1 milliGRAM(s) IV Push every 1 hour PRN      PHYSICAL EXAM:   Vital Signs Last 24 Hrs  T(C): 36.6 (25 Jul 2017 08:00), Max: 37.3 (24 Jul 2017 20:00)  T(F): 97.8 (25 Jul 2017 08:00), Max: 99.1 (24 Jul 2017 20:00)  HR: 93 (25 Jul 2017 12:09) (76 - 124)  BP: 116/58 (25 Jul 2017 12:00) (115/55 - 169/95)  BP(mean): 81 (25 Jul 2017 12:00) (77 - 117)  RR: 35 (25 Jul 2017 12:00) (24 - 37)  SpO2: 93% (25 Jul 2017 12:09) (84% - 96%)    General: Elderly frail; female, resting in bed No acute distress  HEENT: Atraumatic, normocephalic, Aopraxia of b/l eyes, PERRL., visual fields full  Abdomen: Soft, nontender, nondistended   Extremities: No edema    NEUROLOGICAL EXAM: Exam is Limited,   Mental status: Lethargic, responsive to verbal and tactile stimuli Does not follow commands   Cranial Nerves: No discernible facial asymmetry.  Motor exam: Normal tone, left arm weakness 3/5, withdraws to pain weakness, 5/5 RUE, 5/5 RLE,  5/5 LLE   Sensation: Unable to assess light touch   Coordination/ Gait: NA      LABS:                        11.0   11.0  )-----------( 250      ( 24 Jul 2017 11:22 )             33.7    07-24    148<H>  |  112<H>  |  25<H>  ----------------------------<  144<H>  3.9   |  22  |  0.46<L>    Ca    8.7      24 Jul 2017 11:22  Phos  2.5     07-24  Mg     1.9     07-24      Hemoglobin A1C, Whole Blood: 5.7 % (07-23 @ 04:42)
HPI:  Pt is an 86 yo F with a PMH of A_Fib (on Pradaxa), GERD and Hip Fracture who was sent to our ED from [facility] for possible endovascular intervention for acute stroke. Pt became aphasic and lethargic at around 2:45PM and by the time she had arrived to [facility] she was out of the window for tPA. However on arrival to our ED on CT Head a large right MCA territory infarct had appeared making her an unsuitable candidate for endovascular intervention. NIHSS: 27. SABINE: 3. 7/24 extubated, do not re-intubate, palliative care on board.     On admission, the patient was:    NIHSS: 27    ROS: negative [] unable to obtain as patient is comatose/intubated/aphasic []   VITALS:   T(C): 36.6 (07-25-17 @ 08:00), Max: 37.5 (07-24-17 @ 11:00)  HR: 90 (07-25-17 @ 09:00) (76 - 124)  BP: 115/55 (07-25-17 @ 09:00) (115/55 - 169/95)  RR: 27 (07-25-17 @ 09:00) (21 - 37)  SpO2: 90% (07-25-17 @ 09:00) (84% - 99%)    07-24-17 @ 07:01  -  07-25-17 @ 07:00  --------------------------------------------------------  IN: 350 mL / OUT: 0 mL / NET: 350 mL    LABS:  ABG - ( 24 Jul 2017 11:20 )  pH: 7.45  /  pCO2: 36    /  pO2: 136   / HCO3: 25    / Base Excess: 1.3   /  SaO2: 100                           11.0   11.0  )-----------( 250      ( 24 Jul 2017 11:22 )             33.7     148<H>  |  112<H>  |  25<H>  ----------------------------<  144<H>  3.9   |  22  |  0.46<L>    Ca    8.7      24 Jul 2017 11:22  Phos  2.5     07-24  Mg     1.9     07-24    MEDICATIONS  (STANDING):  senna 2 Tablet(s) Oral at bedtime  enoxaparin Injectable 40 milliGRAM(s) SubCutaneous daily  acetylcysteine 20% Inhalation 3 milliLiter(s) Inhalation every 6 hours  ALBUTerol/ipratropium for Nebulization 3 milliLiter(s) Nebulizer every 6 hours    [All pertinent recent Imaging/Reports reviewed]    DEVICES: [] Restraints [] LIDIA/HMV []LD [] ET tube [] Trach [] A-line [] Yanes [] NGT [] Rectal Tube     EXAMINATION:  General:  calm  HEENT:  MMM  Neuro:  NO EO, pupils 2mm reactive b/l, does not FC  Cards:  RRR  Respiratory:  no respiratory distress  Adomen:  soft  Extremities:  RLE edema  Skin:  warm/dry
HPI:  Pt is an 86 yo F with a PMH of A_Fib (on Pradaxa), GERD and Hip Fracture who was sent to our ED from [facility] for possible endovascular intervention for acute stroke. Pt became aphasic and lethargic at around 2:45PM However on arrival to  ED on CT Head a large right MCA territory infarct had appeared making her an unsuitable candidate for endovascular intervention. NIHSS: 27. SABINE: 3. (22 Jul 2017 22:28)      PERTINENT PM/SXH:   Hip fracture  GERD (gastroesophageal reflux disease)  Atrial fibrillation    H/O major orthopedic surgery    SOCIAL HISTORY:   Significant other/partner:  [ ] YES  [x ] NO               Children:  [x ] YES  [ ] NO                   Zoroastrianism/Spirituality:Confucianist  Substance hx:  [ ] YES   [x ] NO                   Tobacco hx:  [ ] YES  [ x] NO                       Alcohol hx: [ ] YES  [x ] NO         Home Opioid hx:  [ ] YES  [ x] NO   Living Situation: [x ] Home with daughter Judy  [ ] Long term care  [ ] Rehab [ ] Other    FAMILY HISTORY:  No pertinent family history in first degree relatives    [ x] Family history non-contributory     BASELINE (I)ADLs (prior to admission):  Alexander: [x ] total  [ ] moderate [ ] dependent    ADVANCE DIRECTIVES:    DNR Yes    MOLST  [ ] YES [ x] NO                      [ ] Completed  Health Care Proxy [ ] YES  [x ] NO   [ ] Completed  Living Will  [ ] YES [x ] NO             [x ] Surrogate  [ ] HCP  [ ] Guardian:     Judy Hernandez/ daughter                                                             Phone#:    Allergies    No Known Allergies    Intolerances        MEDICATIONS  (STANDING):  pantoprazole  Injectable 40 milliGRAM(s) IV Push daily  senna 2 Tablet(s) Oral at bedtime  chlorhexidine 0.12% Liquid 15 milliLiter(s) Swish and Spit two times a day  atorvastatin 40 milliGRAM(s) Oral at bedtime  aspirin  chewable 81 milliGRAM(s) Oral daily  enoxaparin Injectable 40 milliGRAM(s) SubCutaneous daily  digoxin     Tablet 0.125 milliGRAM(s) Oral daily  calcium carbonate  625 mG + Vitamin D (OsCal 250 + D) 1 Tablet(s) Oral daily  glycopyrrolate 1 milliGRAM(s) Oral every 12 hours  glycopyrrolate Injectable 0.2 milliGRAM(s) IV Push once  acetylcysteine 20% Inhalation 3 milliLiter(s) Inhalation every 6 hours  ALBUTerol/ipratropium for Nebulization 3 milliLiter(s) Nebulizer every 6 hours    MEDICATIONS  (PRN):      PRESENT SYMPTOMS:  Source: [ ] Patient   [ ] Family   [ x] Team     Pain:                        [ ] No [ ] Yes             [ ] Mild [ ] Moderate [ ] Severe    Onset -  Location -  Duration -  Character -  Alleviating/Aggravating -  Radiation -  Timing -      Dyspnea:                [ ] No [ ] Yes             [ ] Mild [ ] Moderate [ ] Severe    Anxiety:                  [ ] No [ ] Yes             [ ] Mild [ ] Moderate [ ] Severe    Fatigue:                  [ ] No [ ] Yes             [ ] Mild [ ] Moderate [ ] Severe    Nausea:                  [ ] No [ ] Yes             [ ] Mild [ ] Moderate [ ] Severe    Loss of appetite:   [ ] No [ ] Yes             [ ] Mild [ ] Moderate [ ] Severe    Constipation:        [ ] No [ ] Yes             [ ] Mild [ ] Moderate [ ] Severe    Other Symptoms:  [ ] All other review of systems negative   [ x] Unable to obtain due to poor mentation     Karnofsky Performance Score/Palliative Performance Status Version 2:   10     %    PHYSICAL EXAM:  Vital Signs Last 24 Hrs  T(C): 37.5 (24 Jul 2017 11:00), Max: 38.1 (23 Jul 2017 20:00)  T(F): 99.5 (24 Jul 2017 11:00), Max: 100.5 (23 Jul 2017 20:00)  HR: 96 (24 Jul 2017 14:00) (75 - 110)  BP: 152/70 (24 Jul 2017 14:00) (114/57 - 157/81)  BP(mean): 101 (24 Jul 2017 14:00) (82 - 123)  RR: 26 (24 Jul 2017 14:00) (16 - 56)  SpO2: 98% (24 Jul 2017 14:00) (95% - 100%) I&O's Summary    23 Jul 2017 07:01  -  24 Jul 2017 07:00  --------------------------------------------------------  IN: 1175 mL / OUT: 150 mL / NET: 1025 mL    24 Jul 2017 07:01  -  24 Jul 2017 14:53  --------------------------------------------------------  IN: 350 mL / OUT: 0 mL / NET: 350 mL        General:  [ ] Alert  [ ] Oriented x      [x ] Lethargic  [ ] Agitated   [ ] Cachexia   [ ] Unarousable  [ ] Verbal  [ x] Non-Verbal    HEENT:  [ ] Normal   [ ] Dry mouth   [ x] ET Tube    [ ] Trach  [ ] Oral lesions    Lungs:   [ ] Clear [ ] Tachypnea  [ ] Audible excessive secretions   [ ] Rhonchi        [ ] Right [ ] Left [ ] Bilateral  [ ] Crackles        [ ] Right [ ] Left [ ] Bilateral  [ ] Wheezing     [ ] Right [ ] Left [ ] Bilateral    Cardiovascular:  [ ] Regular [ ] Irregular [x ] Tachycardia   [ ] Bradycardia  [ ] Murmur [ ] Other    Abdomen: [ ]x Soft  [ ] Distended   [ ] +BS  [ ] Non tender [ ] Tender  [ ]PEG   [x ]OGT/ NGT   Last BM:       Genitourinary: [ ] Normal [ ] Incontinent   [ ] Oliguria/Anuria   [x ] Yanes    Musculoskeletal:  [ ] Normal   [ ] Weakness  [ x] Bedbound/Wheelchair bound [ ] Edema    Neurological: [ ] No focal deficits  [x ] Cognitive impairment  [ ] Dysphagia [ ] Dysarthria [ ] Paresis [ ] Other     Skin: [x ] Normal   [ ] Pressure ulcer(s)                  [ ] Rash    LABS:                        11.0   11.0  )-----------( 250      ( 24 Jul 2017 11:22 )             33.7     07-24    148<H>  |  112<H>  |  25<H>  ----------------------------<  144<H>  3.9   |  22  |  0.46<L>    Ca    8.7      24 Jul 2017 11:22  Phos  2.5     07-24  Mg     1.9     07-24    TPro  7.2  /  Alb  3.7  /  TBili  0.4  /  DBili  x   /  AST  25  /  ALT  17  /  AlkPhos  130<H>  07-22    PT/INR - ( 22 Jul 2017 18:50 )   PT: 12.9 sec;   INR: 1.18 ratio         PTT - ( 22 Jul 2017 18:50 )  PTT:42.9 sec      Shock: [ ] Septic [ ] Cardiogenic [ ] Neurologic [ ] Hypovolemic  Vasopressors x   Inotrophs x     Protein Calorie Malnutrition: [ ] Mild [ ] Moderate [ ] Severe    Oral Intake: [ ] Unable/mouth care only [ ] Minimal [ ] Moderate [ ] Full Capability  Diet: [ ] NPO [ ] Tube feeds [ ] TPN [ ] Other     RADIOLOGY & ADDITIONAL STUDIES:    REFERRALS:   [ ] Chaplaincy  [ ] Hospice  [ ] Child Life  [ ] Social Work  [ ] Case management [ ] Holistic Therapy
INTERVAL HPI/OVERNIGHT EVENTS: More alert but remains non verbal, audible secretions      Allergies    No Known Allergies    Intolerances      ADVANCE DIRECTIVES:    DNR Yes  Yes    MOLST [ ] YES [x ] NO     MEDICATIONS  (STANDING):  senna 2 Tablet(s) Oral at bedtime  enoxaparin Injectable 40 milliGRAM(s) SubCutaneous daily  acetylcysteine 20% Inhalation 3 milliLiter(s) Inhalation every 6 hours  ALBUTerol/ipratropium for Nebulization 3 milliLiter(s) Nebulizer every 6 hours    MEDICATIONS  (PRN):  morphine  - Injectable 1 milliGRAM(s) IV Push every 1 hour PRN respiratory distress  glycopyrrolate Injectable 0.1 milliGRAM(s) IV Push every 12 hours PRN oral secretion      PRESENT SYMPTOMS:  Source: [ ] Patient   [x ] Family   [x ] Team     Pain:                        [ ] No [x ] Yes             [x ] Mild [ ] Moderate [ ] Severe    Onset -today  Location -right hip/leg (site of fractured hip: orif 6/7/17  Duration -constant  Character -sharp  Alleviating/Aggravating -movement  Radiation -down leg  Timing -    Dyspnea:                [ ] No [x ] Yes             [ x] Mild [ ] Moderate [ ] Severe    Anxiety:                  [ ] No [x ] Yes             [ x] Mild [ ] Moderate [ ] Severe    Fatigue:                  [ ] No [x ] Yes             [x ] Mild [ ] Moderate [ ] Severe    Nausea:                  [x ] No [ ] Yes             [ ] Mild [ ] Moderate [ ] Severe    Loss of appetite:   [ ] No [ x] Yes             [ ] Mild [ ] Moderate [ ] Severe    Constipation:        [x ] No [ ] Yes             [ ] Mild [ ] Moderate [ ] Severe    Other Symptoms:  [x] All other review of systems negative   [ ] Unable to obtain due to poor mentation     Karnofsky Performance Score/Palliative Performance Status Version 2:      10   %    PHYSICAL EXAM:  Vital Signs Last 24 Hrs  T(C): 36.6 (25 Jul 2017 08:00), Max: 37.5 (24 Jul 2017 11:00)  T(F): 97.8 (25 Jul 2017 08:00), Max: 99.5 (24 Jul 2017 11:00)  HR: 90 (25 Jul 2017 09:00) (76 - 124)  BP: 115/55 (25 Jul 2017 09:00) (115/55 - 169/95)  BP(mean): 77 (25 Jul 2017 09:00) (77 - 123)  RR: 27 (25 Jul 2017 09:00) (21 - 37)  SpO2: 90% (25 Jul 2017 09:00) (84% - 99%)    24 Jul 2017 07:01  -  25 Jul 2017 07:00  --------------------------------------------------------  IN: 350 mL / OUT: 0 mL / NET: 350 mL                          11.0   11.0  )-----------( 250      ( 24 Jul 2017 11:22 )             33.7           General:  [ ] Alert  [ ] Oriented x      [ x] Lethargic  [x ] Agitated   [ ] Cachexia   [ ] Unarousable  [ ] Verbal  [x ] Non-Verbal    HEENT:  [ ] Normal   [x ] Dry mouth   [ ] ET Tube    [ ] Trach  [ ] Oral lesions    Lungs:   [ ] Clear [ ] Tachypnea  [x ] Audible excessive secretions   [ ] Rhonchi        [ ] Right [ ] Left [ ] Bilateral  [ ] Crackles        [ ] Right [ ] Left [ ] Bilateral  [ ] Wheezing     [ ] Right [ ] Left [ ] Bilateral    Cardiovascular:  [ ] Regular [ ] Irregular [x ] Tachycardia   [ ] Bradycardia  [ ] Murmur [ ] Other    Abdomen: [ x] Soft  [ ] Distended   [ ] +BS  [ ] Non tender [ ] Tender  [ ]PEG   [ ]OGT/ NGT   Last BM:       Genitourinary: [ ] Normal [ ] Incontinent   [ ] Oliguria/Anuria   [ x] Yanes    Musculoskeletal:  [ ] Normal   [ ] Weakness  [ x] Bedbound/Wheelchair bound [ ] Edema    Neurological: [ ] No focal deficits  [x ] Cognitive impairment  [ ] Dysphagia [ ] Dysarthria [ ] Paresis [ ] Other     Skin: [ x] Normal   [ ] Pressure ulcer(s)                  [ ] Rash    LABS:                        11.0   11.0  )-----------( 250      ( 24 Jul 2017 11:22 )             33.7     07-24    148<H>  |  112<H>  |  25<H>  ----------------------------<  144<H>  3.9   |  22  |  0.46<L>    Ca    8.7      24 Jul 2017 11:22  Phos  2.5     07-24  Mg     1.9     07-24            Shock: [ ] Septic [ ] Cardiogenic [ x] Neurologic [ ] Hypovolemic  Vasopressors x   Inotrophs x     Oral Intake: [ ] Unable/mouth care only [ ] Minimal [ ] Moderate [ ] Full Capability    Diet: [ ] NPO [ ] Tube feeds [ ] TPN [ ] Other     RADIOLOGY & ADDITIONAL STUDIES:    REFERRALS:   [ ] Chaplaincy  [ ] Hospice  [ ] Child Life  [ ] Social Work  [ ] Case management [ ] Holistic Therapy
THE PATIENT WAS SEEN AND EXAMINED BY MARILY Schaefer NP, findings reviewed with Dr. Funk.     HPI:  Pt is an 84 yo F with a PMH of AFib (on Pradaxa), GERD and Hip Fracture who was sent to our ED from [facility] for possible endovascular intervention for acute stroke. Pt became aphasic and lethargic at around 2:45PM However on arrival to our ED on CT Head a large right MCA territory infarct had appeared making her an unsuitable candidate for endovascular intervention. NIHSS: 27. SABINE: 3      SUBJECTIVE: No events overnight.  No new neurologic complaints.      pantoprazole  Injectable 40 milliGRAM(s) IV Push daily  senna 2 Tablet(s) Oral at bedtime  chlorhexidine 0.12% Liquid 15 milliLiter(s) Swish and Spit two times a day  atorvastatin 40 milliGRAM(s) Oral at bedtime  aspirin  chewable 81 milliGRAM(s) Oral daily  enoxaparin Injectable 40 milliGRAM(s) SubCutaneous daily  digoxin     Tablet 0.125 milliGRAM(s) Oral daily  calcium carbonate  625 mG + Vitamin D (OsCal 250 + D) 1 Tablet(s) Oral daily  glycopyrrolate 1 milliGRAM(s) Oral every 12 hours  glycopyrrolate Injectable 0.2 milliGRAM(s) IV Push once  acetylcysteine 20% Inhalation 3 milliLiter(s) Inhalation every 6 hours  ALBUTerol/ipratropium for Nebulization 3 milliLiter(s) Nebulizer every 6 hours      PHYSICAL EXAM:   Vital Signs Last 24 Hrs  T(C): 37.1 (24 Jul 2017 15:00), Max: 38.1 (23 Jul 2017 20:00)  T(F): 98.8 (24 Jul 2017 15:00), Max: 100.5 (23 Jul 2017 20:00)  HR: 102 (24 Jul 2017 15:00) (75 - 110)  BP: 145/108 (24 Jul 2017 15:00) (114/57 - 157/81)  BP(mean): 123 (24 Jul 2017 15:00) (82 - 123)  RR: 33 (24 Jul 2017 15:00) (16 - 33)  SpO2: 97% (24 Jul 2017 15:00) (95% - 100%)    General: Elderly female. Orally intubated   No acute distress  Abdomen: Soft, nontender, nondistended   Extremities: No edema    NEUROLOGICAL EXAM: Exam is limited due to oral intubation.  Mental status: Awake, attempts to follow commands (hand grasp,  right arm elevation). Exam is limited as patient is orally intubated.   Cranial Nerves PERRL, eye apraxia, No discernible facial asymmetry, no nystagmus.  Motor exam: Normal tone. Strength  Left arm weakness 3/5 no drift, 5/5 RUE, 5/5 RLE,4/5 LLE.  Sensation: NA  Coordination/ Gait: NA  Card: afib  Pulmonary: On CPAP, tolerating well, sat 99 %.      LABS                        11.0   11.0  )-----------( 250      ( 24 Jul 2017 11:22 )             33.7    07-24    148<H>  |  112<H>  |  25<H>  ----------------------------<  144<H>  3.9   |  22  |  0.46<L>    Ca    8.7      24 Jul 2017 11:22  Phos  2.5     07-24  Mg     1.9     07-24    TPro  7.2  /  Alb  3.7  /  TBili  0.4  /  DBili  x   /  AST  25  /  ALT  17  /  AlkPhos  130<H>  07-22  PT/INR - ( 22 Jul 2017 18:50 )   PT: 12.9 sec;   INR: 1.18 ratio         PTT - ( 22 Jul 2017 18:50 )  PTT:42.9 sec  Hemoglobin A1C, Whole Blood: 5.7 % (07-23 @ 04:42)    IMAGING:  EXAM:  CHEST-PORTABLE URGENT                        PROCEDURE DATE:  07/24/2017      Indication: Intubated.    Impression:    The heart is enlarged. Markedly loss of volume left lung compatible with   partially collapsed. Small left pleural effusion cannot be ruled out. The   right lung is clear. Calcified aortic knob. Endotracheal tube and NG tube   are in good position.
HPI:  Pt is an 84 yo F with a PMH of A_Fib (on Pradaxa), GERD and Hip Fracture who was sent to our ED from [facility] for possible endovascular intervention for acute stroke. Pt became aphasic and lethargic at around 2:45PM and by the time she had arrived to [facility] she was out of the window for tPA. However on arrival to our ED on CT Head a large right MCA territory infarct had appeared making her an unsuitable candidate for endovascular intervention. NIHSS: 27. SABINE: 3.    On admission, the patient was:  GCS:  Albarran-Cramer:  modified Garcias:  ICH score:  NIHSS: 27    No events overnight    VITALS:  T(C): , Max: 37.2 (07-22-17 @ 22:40)  HR:  (62 - 98)  BP:  (105/52 - 207/96)  ABP: --  RR:  (14 - 34)  SpO2:  (98% - 100%)  Wt(kg): --  Device: Avea, Mode: AC/ CMV (Assist Control/ Continuous Mandatory Ventilation), RR (machine): 12, TV (machine): 350, FiO2: 40, PEEP: 5, ITime: 1, MAP: 9, PIP: 17    07-22-17 @ 07:01  -  07-23-17 @ 07:00  --------------------------------------------------------  IN: 54.8 mL / OUT: 440 mL / NET: -385.2 mL      LABS:  Na: 142 (07-22 @ 18:50)  K: 5.0 (07-22 @ 18:50)  Cl: 106 (07-22 @ 18:50)  CO2: 24 (07-22 @ 18:50)  BUN: 31 (07-22 @ 18:50)  Cr: 0.75 (07-22 @ 18:50)  Glu: 184(07-22 @ 18:50)    Hgb: 11.8 (07-22 @ 18:50)  Hct: 36.8 (07-22 @ 18:50)  WBC: 8.9 (07-22 @ 18:50)  Plt: 299 (07-22 @ 18:50)  PT: 12.9 (07-22 @ 18:50)  INR: 1.18 (07-22 @ 18:50)  aPTT: 42.9 (07-22 @ 18:50)    IMAGING:   Recent imaging studies were reviewed.    MEDICATIONS:    pantoprazole  Injectable 40 milliGRAM(s) IV Push daily  senna 2 Tablet(s) Oral at bedtime  aspirin Suppository 300 milliGRAM(s) Rectal daily  sodium chloride 2% . 1000 milliLiter(s) IV Continuous <Continuous>  chlorhexidine 0.12% Liquid 15 milliLiter(s) Swish and Spit two times a day    EXAMINATION:  General:  calm  HEENT:  MMM, intubated  Neuro:  NO EO, pupils 2mm reactive b/l, +cough/gag, RUE LC, LUE no movement, RLE WD, LLE TF  Cards:  RRR  Respiratory:  no respiratory distress  Adomen:  soft  Extremities:  RLE edema  Skin:  warm/dry

## 2017-07-25 NOTE — PROGRESS NOTE ADULT - PROBLEM SELECTOR PLAN 2
s/p extubation, DO NOT  reintubate, medicate to comfort if pt shows signs of respiratory distress that would otherwise indicate intubation.  Recommendations:  Morphine 1 mg q1 hours prn dyspnea.  Low tolerance for drip

## 2017-07-25 NOTE — PROGRESS NOTE ADULT - ASSESSMENT
ASSESSMENT: Pt is an 84 yo F with a PMH of AFib (on Pradaxa), GERD and Hip Fracture who was sent to our ED from [facility] for possible endovascular intervention for acute stroke. Pt became aphasic and lethargic CT Head a large right MCA territory infarct had appeared making her an unsuitable candidate for endovascular intervention.  Patient status poor, goal of care were discussed with family, patient had expressed desire for no extraordinary measures. Plan is for comfort measures only. Neurology signing off case as a result in keeping with family directive.     Supportive Care only. Patient awaiting transfer to Palliative care DNR/ DNI in effect.

## 2017-07-25 NOTE — PROGRESS NOTE ADULT - PROBLEM SELECTOR PLAN 1
Large right MCA infarct, following simple commands, s/p extubation
Large right MCA infarct, following simple commands, s/p extubation

## 2017-07-25 NOTE — PROGRESS NOTE ADULT - PROBLEM SELECTOR PLAN 5
-right lower extremity at sight of ORIF  -Recommend Morphine 1 mg q 1 hour prn for mild mod severe pain

## 2017-07-25 NOTE — GOALS OF CARE CONVERSATION - PERSONAL ADVANCE DIRECTIVE - CONVERSATION DETAILS
Daughters Judy and Shani both agree that pursuit of trach and peg are not consistent with quality of life and pts known wishes to not be dependent on others for basic needs.Daughters agree to transfer pt to PCU in the hopes of stabilizing pt for subacute rehab however given pts comorbidities,frailty prior to hospitalization and now s/p infarct s/p extubation with audible secretions and periods of respiratory distress , family aware that prognosis is poor for a neurological/functional recovery.

## 2017-07-25 NOTE — PROGRESS NOTE ADULT - ASSESSMENT
Summary: 86yo woman R MCA stroke, no tPA    NEURO:  q2h neuro checks, pain control, no sedation at this time    CARDS:  -150, afib on pradaxa, hold a/c this time, rate controlled currently, cont digoxin    PULM:  sat > 92%, NC, do not reintubate; mucomyst/nebs    RENAL:  IVL    GASTRO: NPO, no PEG per family  ---> Stress ulcer prophylaxis:  not required    HEME:  monitor H/H , hx hip fx, recent rehab stay, DVT screen at admission negative  ---> DVT prophylaxis: SCDs, lovenox    ENDO:  euglycemia    ID:  afebrile    Code status:  DNR/DNI, palliative care following  Disposition:  PCU

## 2017-07-25 NOTE — PROGRESS NOTE ADULT - ASSESSMENT
84yo woman s/p ORIF 6/7/2017 now s/p R MCA stroke, no tPA, extubated, following simple commands, audible secretions this morning, family at bedside , pt c/o right hip pain , at surgical site, pt able to nod 'yes" or "no" when asked about pain in our common language: Mongolian.

## 2017-07-25 NOTE — PROGRESS NOTE ADULT - PROBLEM SELECTOR PLAN 4
Lengthy discussion with daughter Judy and daughter Hawa.  Judy has taken care of Mom and lives with her.  Pts   8 weeks ago, followed by pt falling sustaining hip fracture.  In rehab pt noted to have change in mental status in setting of MCA stroke.  Daughters do not wish to pursue re intubation. If pt does well post extubation , rehab is the dispo, if patient does poorly , full comfort approach is the goals of care.  Transfer to PCU when bed available. Lengthy discussion with daughter Judy and daughter Hawa.  Judy has taken care of Mom and lives with her.  Pts   8 weeks ago, followed by pt falling sustaining hip fracture.  In rehab pt noted to have change in mental status in setting of MCA stroke.  Daughters do not wish to pursue re intubation. If pt does well post extubation , rehab is the dispo, if patient does poorly  , full comfort approach is the goals of care.  Transfer to PCU when bed available.

## 2017-07-25 NOTE — GOALS OF CARE CONVERSATION - PERSONAL ADVANCE DIRECTIVE - TREATMENT GUIDELINE COMMENT
Family does not want PEG tube, no further invasive procedures.  Please continue supportive care, family is realistic about likely poor outcome

## 2017-07-25 NOTE — PROGRESS NOTE ADULT - ATTENDING COMMENTS
85 year old woman admitted for right MCA stroke. Family wishes to pursue comfort measures.    Patient appears comfortable.    Awaiting PCU bed.

## 2017-07-26 NOTE — DISCHARGE NOTE FOR THE EXPIRED PATIENT - HOSPITAL COURSE
86 yo F with a PMH of  A Fib (on Pradaxa), GERD and Hip Fracture who was sent to our ED from [facility] for possible endovascular intervention for acute stroke. Pt became aphasic and lethargic and found to have a large right MCA territory infarct on CT. Per medical records, she was an unsuitable candidate for endovascular intervention. She was extubated on  and developed respiratory distress and audible secretions. Family wanted comfort care and palliative care team consulted. Patient transferred to PCU  and  .
